# Patient Record
Sex: FEMALE | Race: WHITE | NOT HISPANIC OR LATINO | URBAN - METROPOLITAN AREA
[De-identification: names, ages, dates, MRNs, and addresses within clinical notes are randomized per-mention and may not be internally consistent; named-entity substitution may affect disease eponyms.]

---

## 2022-09-29 ENCOUNTER — OFFICE (OUTPATIENT)
Dept: URBAN - METROPOLITAN AREA CLINIC 19 | Facility: CLINIC | Age: 87
End: 2022-09-29
Payer: COMMERCIAL

## 2022-09-29 VITALS
SYSTOLIC BLOOD PRESSURE: 152 MMHG | HEART RATE: 78 BPM | DIASTOLIC BLOOD PRESSURE: 67 MMHG | HEIGHT: 60 IN | OXYGEN SATURATION: 97 %

## 2022-09-29 DIAGNOSIS — Z86.010 PERSONAL HISTORY OF COLONIC POLYPS: ICD-10-CM

## 2022-09-29 DIAGNOSIS — K21.9 GASTRO-ESOPHAGEAL REFLUX DISEASE WITHOUT ESOPHAGITIS: ICD-10-CM

## 2022-09-29 DIAGNOSIS — K59.00 CONSTIPATION, UNSPECIFIED: ICD-10-CM

## 2022-09-29 DIAGNOSIS — J44.9 CHRONIC OBSTRUCTIVE PULMONARY DISEASE, UNSPECIFIED: ICD-10-CM

## 2022-09-29 PROCEDURE — 99204 OFFICE O/P NEW MOD 45 MIN: CPT

## 2022-09-29 RX ORDER — FAMOTIDINE 40 MG/1
40 TABLET, FILM COATED ORAL
Qty: 30 | Refills: 11 | Status: ACTIVE
Start: 2022-09-29

## 2022-09-29 RX ORDER — POLYETHYLENE GLYCOL 3350 17 G/17G
POWDER, FOR SOLUTION ORAL
Qty: 60 | Refills: 5 | Status: ACTIVE
Start: 2022-09-29

## 2022-09-29 NOTE — SERVICEHPINOTES
89-year-old white female in a wheelchair referred by her provider at her SNF here for complaints of symptomatic reflux and heartburn as well as constipation.  She has been on pantoprazole 40 mg for years, but recently has had worsening symptoms.  She is here with a transporter and does seem to have quite a few comorbidities, including COPD, coronary artery disease, HTN, HLD, CKD and dementia.  She denies any issues with dysphagia, nausea, vomiting, abdominal pain or overt GI bleeding.  She does have chronic constipation and is given "some kind of laxative" at her nursing home that does not seem to work very well.  She has used enemas on occasion which seems to provide relief.
br
br   She was last here to see Earl Malagon MD 10/10/13 where she underwent an EGD / colonoscopy which found a hiatal hernia, diverticulosis coli and removed a 3 cm adenomatous polyp.  She has not had any subsequent colonoscopies.  Family history is negative for colon neoplasm.

## 2022-09-29 NOTE — SERVICENOTES
The patient's assessment was reviewed with Dr. Bryan and a collaborative plan of care was established.